# Patient Record
Sex: MALE | Race: ASIAN | NOT HISPANIC OR LATINO | URBAN - METROPOLITAN AREA
[De-identification: names, ages, dates, MRNs, and addresses within clinical notes are randomized per-mention and may not be internally consistent; named-entity substitution may affect disease eponyms.]

---

## 2023-10-30 ENCOUNTER — EMERGENCY (EMERGENCY)
Facility: HOSPITAL | Age: 23
LOS: 1 days | Discharge: HOME | End: 2023-10-30
Attending: EMERGENCY MEDICINE
Payer: SELF-PAY

## 2023-10-30 VITALS
OXYGEN SATURATION: 98 % | HEART RATE: 99 BPM | SYSTOLIC BLOOD PRESSURE: 131 MMHG | TEMPERATURE: 99 F | DIASTOLIC BLOOD PRESSURE: 70 MMHG | RESPIRATION RATE: 19 BRPM

## 2023-10-30 VITALS
OXYGEN SATURATION: 98 % | RESPIRATION RATE: 18 BRPM | WEIGHT: 163.14 LBS | HEIGHT: 66.14 IN | DIASTOLIC BLOOD PRESSURE: 94 MMHG | HEART RATE: 100 BPM | TEMPERATURE: 99 F | SYSTOLIC BLOOD PRESSURE: 158 MMHG

## 2023-10-30 PROCEDURE — 99283 EMERGENCY DEPT VISIT LOW MDM: CPT

## 2023-10-30 PROCEDURE — 99285 EMERGENCY DEPT VISIT HI MDM: CPT

## 2023-10-30 PROCEDURE — 82962 GLUCOSE BLOOD TEST: CPT

## 2023-10-30 RX ORDER — ACETAMINOPHEN 500 MG
650 TABLET ORAL ONCE
Refills: 0 | Status: COMPLETED | OUTPATIENT
Start: 2023-10-30 | End: 2023-10-30

## 2023-10-30 RX ADMIN — Medication 650 MILLIGRAM(S): at 13:28

## 2023-10-30 RX ADMIN — Medication 650 MILLIGRAM(S): at 12:58

## 2023-10-30 NOTE — ED PROVIDER NOTE - PATIENT PORTAL LINK FT
You can access the FollowMyHealth Patient Portal offered by [facility] by registering at the following website: [email or web address]. By joining [facility]’s FollowMyHealth portal, you will also be able to view your health information using other applications (apps) compatible with our system.

## 2023-10-30 NOTE — ED ADULT TRIAGE NOTE - CHIEF COMPLAINT QUOTE
janine  found with alcohol intoxication ,  behaving erratically  [facility]

## 2023-10-30 NOTE — ED ADULT NURSE NOTE - CHIEF COMPLAINT QUOTE
janine  found with alcohol intoxication ,  behaving erratically  at [facility]

## 2023-10-30 NOTE — ED ADULT NURSE NOTE - OBJECTIVE STATEMENT
Patient BIBA c/o alcohol intoxication ,behaving erratically  at [facility]. Patient c/o mild headache. Reports drinking heavily x2 days.

## 2023-10-30 NOTE — ED PROVIDER NOTE - CLINICAL SUMMARY MEDICAL DECISION MAKING FREE TEXT BOX
[place] by [place].  We will allow patient to metabolize and reevaluate.

## 2023-10-30 NOTE — ED PROVIDER NOTE - PHYSICAL EXAMINATION
Exam:  General: Patient well appearing, vital signs within normal limits  HEENT: airway patent with moist mucous membranes  Cardiac: RRR S1/S2 with strong peripheral pulses  Respiratory: lungs clear without respiratory distress  GI: abdomen soft, non tender, non distended

## 2023-10-30 NOTE — ED ADULT NURSE NOTE - ED STAT RN HANDOFF DETAILS
Patient has steady gait, MTF symptoms improved after meal provided. verbalized understanding D/C instructions to return for sudden or worsening symptoms.

## 2023-10-30 NOTE — ED PROVIDER NOTE - OBJECTIVE STATEMENT
23-year-old male brought in by ambulance for intoxication and [facility].  Endorses drinking along with smoking a lot of marijuana.